# Patient Record
Sex: FEMALE | Race: OTHER | HISPANIC OR LATINO
[De-identification: names, ages, dates, MRNs, and addresses within clinical notes are randomized per-mention and may not be internally consistent; named-entity substitution may affect disease eponyms.]

---

## 2017-07-13 PROBLEM — Z00.00 ENCOUNTER FOR PREVENTIVE HEALTH EXAMINATION: Status: ACTIVE | Noted: 2017-07-13

## 2018-04-25 ENCOUNTER — APPOINTMENT (OUTPATIENT)
Dept: CT IMAGING | Facility: HOSPITAL | Age: 58
End: 2018-04-25
Payer: COMMERCIAL

## 2018-04-25 ENCOUNTER — OUTPATIENT (OUTPATIENT)
Dept: OUTPATIENT SERVICES | Facility: HOSPITAL | Age: 58
LOS: 1 days | End: 2018-04-25
Payer: COMMERCIAL

## 2018-04-25 PROCEDURE — 71275 CT ANGIOGRAPHY CHEST: CPT

## 2018-04-25 PROCEDURE — 71275 CT ANGIOGRAPHY CHEST: CPT | Mod: 26

## 2023-05-15 ENCOUNTER — APPOINTMENT (OUTPATIENT)
Dept: OPHTHALMOLOGY | Facility: CLINIC | Age: 63
End: 2023-05-15
Payer: COMMERCIAL

## 2023-05-15 ENCOUNTER — NON-APPOINTMENT (OUTPATIENT)
Age: 63
End: 2023-05-15

## 2023-05-15 PROCEDURE — 92250 FUNDUS PHOTOGRAPHY W/I&R: CPT

## 2023-05-15 PROCEDURE — 92004 COMPRE OPH EXAM NEW PT 1/>: CPT

## 2023-06-16 ENCOUNTER — APPOINTMENT (OUTPATIENT)
Dept: OPHTHALMOLOGY | Facility: CLINIC | Age: 63
End: 2023-06-16

## 2023-08-03 ENCOUNTER — NON-APPOINTMENT (OUTPATIENT)
Age: 63
End: 2023-08-03

## 2023-08-03 ENCOUNTER — APPOINTMENT (OUTPATIENT)
Dept: OPHTHALMOLOGY | Facility: CLINIC | Age: 63
End: 2023-08-03
Payer: COMMERCIAL

## 2023-08-03 PROCEDURE — 92002 INTRM OPH EXAM NEW PATIENT: CPT

## 2023-10-11 NOTE — ASU PATIENT PROFILE, ADULT - VISION (WITH CORRECTIVE LENSES IF THE PATIENT USUALLY WEARS THEM):
02-May-2022 09:40
glasses/Partially impaired: cannot see medication labels or newsprint, but can see obstacles in path, and the surrounding layout; can count fingers at arm's length

## 2023-10-11 NOTE — ASU PATIENT PROFILE, ADULT - NSICDXPASTMEDICALHX_GEN_ALL_CORE_FT
PAST MEDICAL HISTORY:  H/O allergic rhinitis     H/O pterygium     History of constipation     Hyperlipidemia     Hypertension     Scoliosis

## 2023-10-12 ENCOUNTER — APPOINTMENT (OUTPATIENT)
Dept: OPHTHALMOLOGY | Facility: AMBULATORY SURGERY CENTER | Age: 63
End: 2023-10-12

## 2023-10-12 ENCOUNTER — TRANSCRIPTION ENCOUNTER (OUTPATIENT)
Age: 63
End: 2023-10-12

## 2023-10-12 ENCOUNTER — OUTPATIENT (OUTPATIENT)
Dept: OUTPATIENT SERVICES | Facility: HOSPITAL | Age: 63
LOS: 1 days | Discharge: HOME | End: 2023-10-12
Payer: COMMERCIAL

## 2023-10-12 VITALS
HEART RATE: 60 BPM | OXYGEN SATURATION: 99 % | RESPIRATION RATE: 16 BRPM | SYSTOLIC BLOOD PRESSURE: 118 MMHG | HEIGHT: 57 IN | DIASTOLIC BLOOD PRESSURE: 65 MMHG | WEIGHT: 117.73 LBS | TEMPERATURE: 97 F

## 2023-10-12 VITALS
RESPIRATION RATE: 16 BRPM | DIASTOLIC BLOOD PRESSURE: 76 MMHG | OXYGEN SATURATION: 98 % | TEMPERATURE: 98 F | HEART RATE: 60 BPM | SYSTOLIC BLOOD PRESSURE: 119 MMHG

## 2023-10-12 PROCEDURE — 65426 REMOVAL OF EYE LESION: CPT | Mod: LT

## 2023-10-12 PROCEDURE — 88304 TISSUE EXAM BY PATHOLOGIST: CPT | Mod: 26

## 2023-10-12 DEVICE — TISSEEL 2ML: Type: IMPLANTABLE DEVICE | Site: LEFT | Status: FUNCTIONAL

## 2023-10-12 RX ORDER — LOSARTAN/HYDROCHLOROTHIAZIDE 100MG-25MG
1 TABLET ORAL
Refills: 0 | DISCHARGE

## 2023-10-12 RX ORDER — ACETAMINOPHEN 500 MG
650 TABLET ORAL ONCE
Refills: 0 | Status: COMPLETED | OUTPATIENT
Start: 2023-10-12 | End: 2023-10-12

## 2023-10-12 RX ORDER — ROSUVASTATIN CALCIUM 5 MG/1
1 TABLET ORAL
Refills: 0 | DISCHARGE

## 2023-10-12 RX ORDER — SODIUM CHLORIDE 9 MG/ML
500 INJECTION, SOLUTION INTRAVENOUS
Refills: 0 | Status: DISCONTINUED | OUTPATIENT
Start: 2023-10-12 | End: 2023-10-12

## 2023-10-12 RX ORDER — FENTANYL CITRATE 50 UG/ML
25 INJECTION INTRAVENOUS
Refills: 0 | Status: DISCONTINUED | OUTPATIENT
Start: 2023-10-12 | End: 2023-10-12

## 2023-10-12 RX ORDER — RIVAROXABAN 15 MG-20MG
1 KIT ORAL
Refills: 0 | DISCHARGE

## 2023-10-12 RX ORDER — POLYETHYLENE GLYCOL 3350 17 G/17G
17 POWDER, FOR SOLUTION ORAL
Refills: 0 | DISCHARGE

## 2023-10-12 RX ORDER — METOPROLOL TARTRATE 50 MG
1 TABLET ORAL
Refills: 0 | DISCHARGE

## 2023-10-12 RX ADMIN — Medication 650 MILLIGRAM(S): at 15:54

## 2023-10-12 RX ADMIN — Medication 325 MILLIGRAM(S): at 15:24

## 2023-10-12 NOTE — OPERATIVE REPORT - OPERATIVE RPOSRT DETAILS
SURGEON: Jocelyn Noriega DO    ASSISTANT: Rama Dial MD    PREOPERATIVE DIAGNOSIS: Pterygium, left eye    POSTOPERATIVE DIAGNOSIS: Pterygium, left eye    OPERATIVE PROCEDURE:    1. Pterygium excision with conjunctival autograft, left eye    ESTIMATED BLOOD LOSS: <1 cc    SPECIMEN/TISSUE REMOVED: None    COMPLICATIONS: None    DESCRIPTION OF PROCEDURE:    The Risks and benefits of surgery were discussed with the patient, and informed consent was obtained. The correct operative site was identified and marked. The patient received antibiotic drops preoperatively. The patient was taken to the operating room with monitored anesthesia care. A peribulbar block was administered around the eye using a 50/50 mixture of 2% lidocaine and 0.75% Marcaine.  The eye was prepped in a sterile manner using Betadine solution. The patient was covered in sterile drapes leaving the eye exposed. The lashes were covered with tegaderm, and a Ron lid speculum was placed to provide exposure.     A tissue marking pen was then used to outline the extent of the body of the pterygium to be excised  from the bulbar surface. 5.5 mm nasal was measured to identify the anterior border of the medial rectus and avoid the muscle. A Evens scissor was then used to excise the body of the pterygium, with extensive tenonectomy performed at the incision site and under surrounding normal conjunctiva. The head of the pterygium was gently peeled off the cornea using a 0.12 forceps. Hemostasis was achieved with wet field cautery. A #57 Davison blade was then used to perform a superficial keratectomy, removing scar tissue from the surface of the cornea and limbus. A charlie cinthia was then used to smooth the cornea-scleral surface. The size of the conjunctival defect was measured.     The eye was then infraducted. A conjunctival autograft of sufficient size to cover the bulbar defect was outlined and created. Extensive tenonectomy was performed on the under surface of the autograft. The two components of the Tisseel adhesive was then brought into the surgical field, and the two components separately applied to the excision site, thereby decreasing the size of the remaining defect. The autograft was then slid off the cornea onto the exposed sclera covering the excision site and maintaining limbal orientation. The two components of Tisseel were again used to adhere the graft to the scleral bed, smoothing it out with a muscle hook. The tissue glue was allowed to cure. At the site of the autograft dissection superiorly, two tissue forceps were used to bring the free edge of the remaining conjunctiva anteriorly towards the limbus. The two components of the Tisseel adhesive were placed on the exposed tenon’s site, and allowed to cure, thus gluing the remaining conjunctiva over the autograft dissection site. The stay suture was removed from the eye. The lid speculum and drapes were carefully removed from the eye. An antibiotic/ steroid ointment was instilled in the eye. The eye was covered with a patch and shield. The patient tolerated the procedure well, without complications, and was taken to the recovery in good condition.

## 2023-10-12 NOTE — ASU DISCHARGE PLAN (ADULT/PEDIATRIC) - NS MD DC FALL RISK RISK
For information on Fall & Injury Prevention, visit: [email or web address] OR  [email or web address] OR  https://www.cdc.gov/steadi/patient.html

## 2023-10-13 ENCOUNTER — APPOINTMENT (OUTPATIENT)
Dept: OPHTHALMOLOGY | Facility: CLINIC | Age: 63
End: 2023-10-13
Payer: COMMERCIAL

## 2023-10-13 ENCOUNTER — NON-APPOINTMENT (OUTPATIENT)
Age: 63
End: 2023-10-13

## 2023-10-13 PROCEDURE — 99024 POSTOP FOLLOW-UP VISIT: CPT

## 2023-10-24 LAB
SURGICAL PATHOLOGY STUDY: SIGNIFICANT CHANGE UP
SURGICAL PATHOLOGY STUDY: SIGNIFICANT CHANGE UP

## 2023-10-27 ENCOUNTER — APPOINTMENT (OUTPATIENT)
Dept: OPHTHALMOLOGY | Facility: CLINIC | Age: 63
End: 2023-10-27
Payer: COMMERCIAL

## 2023-10-27 ENCOUNTER — NON-APPOINTMENT (OUTPATIENT)
Age: 63
End: 2023-10-27

## 2023-10-27 PROCEDURE — 99024 POSTOP FOLLOW-UP VISIT: CPT

## 2024-01-29 ENCOUNTER — APPOINTMENT (OUTPATIENT)
Dept: OPHTHALMOLOGY | Facility: CLINIC | Age: 64
End: 2024-01-29
Payer: COMMERCIAL

## 2024-01-29 ENCOUNTER — NON-APPOINTMENT (OUTPATIENT)
Age: 64
End: 2024-01-29

## 2024-01-29 PROCEDURE — 92250 FUNDUS PHOTOGRAPHY W/I&R: CPT

## 2024-01-29 PROCEDURE — 92014 COMPRE OPH EXAM EST PT 1/>: CPT

## 2024-05-13 ENCOUNTER — APPOINTMENT (OUTPATIENT)
Dept: OPHTHALMOLOGY | Facility: CLINIC | Age: 64
End: 2024-05-13

## 2024-11-18 ENCOUNTER — APPOINTMENT (OUTPATIENT)
Dept: OPHTHALMOLOGY | Facility: CLINIC | Age: 64
End: 2024-11-18

## 2024-11-18 ENCOUNTER — NON-APPOINTMENT (OUTPATIENT)
Age: 64
End: 2024-11-18

## 2024-11-18 PROCEDURE — 92012 INTRM OPH EXAM EST PATIENT: CPT

## 2024-11-18 PROCEDURE — 92133 CPTRZD OPH DX IMG PST SGM ON: CPT

## (undated) DEVICE — SYR LUER LOK 5CC

## (undated) DEVICE — NDL HYPO SAFE 25G X 5/8" (ORANGE)

## (undated) DEVICE — GOWN ROYAL SILK XL

## (undated) DEVICE — SUT VICRYL 7-0 12" TG140-8 DA

## (undated) DEVICE — MARKING PEN DEVON DUAL TIP W RULER

## (undated) DEVICE — VENODYNE/SCD SLEEVE CALF MEDIUM

## (undated) DEVICE — DRAPE MICROSCOPE KNOB COVER SMALL (2 PCS)

## (undated) DEVICE — NDL RETROBULBAR VISITEC 25X1.5

## (undated) DEVICE — PACK ANTERIOR SEGMENT

## (undated) DEVICE — Device

## (undated) DEVICE — GLV 6.5 PROTEXIS (WHITE)

## (undated) DEVICE — ELCTR ERASER BI-P BVL 45DEG 18G

## (undated) DEVICE — SPEAR CELLULOSE 40410

## (undated) DEVICE — TIP SUCTION I/A STR 1USE POLYMER 0.3MM

## (undated) DEVICE — ELCTR BIPOLAR CORD J&J 12FT DISP

## (undated) DEVICE — NDL HYPO SAFE 18G X 1.5" (PINK)